# Patient Record
Sex: MALE | Race: WHITE
[De-identification: names, ages, dates, MRNs, and addresses within clinical notes are randomized per-mention and may not be internally consistent; named-entity substitution may affect disease eponyms.]

---

## 2018-10-30 ENCOUNTER — HOSPITAL ENCOUNTER (EMERGENCY)
Dept: HOSPITAL 25 - UCEAST | Age: 23
Discharge: HOME | End: 2018-10-30
Payer: COMMERCIAL

## 2018-10-30 VITALS — DIASTOLIC BLOOD PRESSURE: 94 MMHG | SYSTOLIC BLOOD PRESSURE: 153 MMHG

## 2018-10-30 DIAGNOSIS — I10: ICD-10-CM

## 2018-10-30 DIAGNOSIS — J06.9: Primary | ICD-10-CM

## 2018-10-30 DIAGNOSIS — Z88.0: ICD-10-CM

## 2018-10-30 DIAGNOSIS — Z91.048: ICD-10-CM

## 2018-10-30 DIAGNOSIS — Z79.899: ICD-10-CM

## 2018-10-30 PROCEDURE — G0463 HOSPITAL OUTPT CLINIC VISIT: HCPCS

## 2018-10-30 PROCEDURE — 99201: CPT

## 2018-10-30 PROCEDURE — 87651 STREP A DNA AMP PROBE: CPT

## 2018-10-30 NOTE — ED
Throat Pain/Nasal Congestion





- HPI Summary


HPI Summary: 





Pt presents w/ URI sx x 3 days. Started as ST after demolishing part of his 

home - thought this was from dust, etc however followiung day develop nasal 

congestion, sinus pressure and dry cough. He's been managing his cough w/ 

albuterol inhaler which works well. Denies fever, chills, N/V/D, chest pain, 

wheezing, SOB, cough keeping him up at night. H/o strep as a youth - will test 

today. Has tried coricidon as well for URI sx - provides relief while in his 

system.





- History of Current Complaint


Chief Complaint: UCRespiratory


Time Seen by Provider: 10/30/18 15:10


Hx Obtained From: Patient





- Allergies/Home Medications


Allergies/Adverse Reactions: 


 Allergies











Allergy/AdvReac Type Severity Reaction Status Date / Time


 


Penicillins Allergy  Rash Verified 10/30/18 15:11


 


cats and seasonal allergies Allergy  Sneezing Uncoded 10/30/18 15:11











Home Medications: 


 Home Medications





Cetirizine HCl [Zyrtec] 10 mg PO DAILY 10/30/18 [History Confirmed 10/30/18]


Lisinopril 10 mg PO DAILY 10/30/18 [History Confirmed 10/30/18]


raNITIdine HCl [Ranitidine HCl] 150 mg PO BID 10/30/18 [History Confirmed 10/30/

18]











PMH/Surg Hx/FS Hx/Imm Hx


Previously Healthy: Yes


Endocrine/Hematology History: 


   Denies: Autoimmune Disease


Cardiovascular History: Reports: Hx Hypertension


Respiratory History: Reports: Hx Asthma - well controlled





- Immunization History


Immunizations Up to Date: Yes


Infectious Disease History: No


Infectious Disease History: 


   Denies: Traveled Outside the [place] in Last 30 Days





- Social History


Occupation: Unemployed


Lives: With Family - parents


Alcohol Use: Weekly


Hx Substance Use: No


Substance Use Type: Reports: None


Hx Tobacco Use: No


Smoking Status (MU): Never Smoked Tobacco





Review of Systems


Constitutional: Negative


Eyes: Negative


Positive: Sore Throat, Nasal Discharge.  Negative: Ear Ache


Cardiovascular: Negative


Positive: Cough.  Negative: Shortness Of Breath


Gastrointestinal: Negative


Positive: no symptoms reported


Musculoskeletal: Negative


Negative: Rash


Negative: Headache


Psychological: Normal


All Other Systems Reviewed And Are Negative: Yes





Physical Exam


Triage Information Reviewed: Yes


Vital Signs On Initial Exam: 


 Initial Vitals











Temp Pulse Resp BP Pulse Ox


 


 99.8 F   95   18   153/94   100 


 


 10/30/18 15:13  10/30/18 15:13  10/30/18 15:13  10/30/18 15:13  10/30/18 15:13











Vital Signs Reviewed: Yes


Appearance: Positive: Well-Appearing, No Pain Distress, Well-Nourished


Skin: Positive: Warm, Skin Color Reflects Adequate Perfusion, Dry - no rash


Head/Face: Positive: Normal Head/Face Inspection


Eyes: Positive: Normal, EOMI, Conjunctiva Clear.  Negative: Conjunctiva 

Inflammed, Discharge


ENT: Positive: Hearing grossly normal, Pharyngeal erythema - mild - 

cobblestoning, Nasal congestion, Nasal drainage - clear, TMs normal, Tonsillar 

swelling - +2, Sinus tenderness, Uvula midline.  Negative: Tonsillar exudate, 

Trismus, Muffled voice, Hoarse voice


Dental: Negative: Abscess @


Neck: Positive: Supple, No Lymphadenopathy, Tenderness @


Respiratory/Lung Sounds: Positive: Clear to Auscultation, Breath Sounds 

Present.  Negative: Rales, Rhonchi, Wheezes


Cardiovascular: Positive: Normal, RRR, S1, S2.  Negative: Murmur, Rub


Abdomen Description: Positive: Nontender, No Organomegaly, Soft


Bowel Sounds: Positive: Present


Musculoskeletal: Positive: Normal, Strength/ROM Intact


Neurological: Positive: Normal, Sensory/Motor Intact, Alert, Oriented to Person 

Place, Time, CN Intact II-III


Psychiatric: Positive: Normal





Diagnostics





- Vital Signs


 Vital Signs











  Temp Pulse Resp BP Pulse Ox


 


 10/30/18 15:13  99.8 F  95  18  153/94  100














- Laboratory


Lab Results: 


 Lab Results











  10/30/18 Range/Units





  15:35 


 


Group A Strep Rapid  Negative  (Negative)  











Lab Statement: Any lab studies that have been ordered have been reviewed, and 

results considered in the medical decision making process.





EENT Course/Dx





- Course


Course Of Treatment: neg strep  -suspect viral URI vs. resp irritation from 

dust - supportive care and f/u as needed





- Diagnoses


Provider Diagnoses: 


 URI (upper respiratory infection)








Discharge





- Sign-Out/Discharge


Documenting (check all that apply): Patient Departure


All imaging exams completed and their final reports reviewed: No Studies





- Discharge Plan


Condition: Stable


Disposition: HOME


Patient Education Materials:  Asthma (ED), Upper Respiratory Infection (ED)


Referrals: 


Esther Lama NP [Primary Care Provider] - 


Additional Instructions: 


Use your inhaler as needed





Try the following to aid with your symptoms:





Nasal wash (netti pot or saline spray) & salt water throat gargles 2 x day 


Drink  you body weight in ounces of water every day


Sleep 8+ hours per night


Avoid Dairy and sugar


Hot herbal/decaf tea with lemon & honey  


Chicken broth (preferably organic, free range chicken)


Humidifier in house, but especially near bed at night


Keep home temperature at 68F or less to reduce dryness


Use cough drops/throat lozenges


Try a facial steam with or without eucalyptus essential oil or Chandler's Vapor rub 

for congestion


Avoid smoke, candles, perfumes, colognes, scented soaps/detergents , air 

fresheners and cleaning chemicals as these can cause airway irritation and 

trigger coughing





*If worse, seek medical care








- Billing Disposition and Condition


Condition: STABLE


Disposition: Home





- Attestation Statements


Provider Attestation: 





I was available for consult. This patient was seen by the MANUEL. The patient was 

not presented to, seen by, or examined by me. -Rose